# Patient Record
Sex: FEMALE | Race: OTHER | Employment: FULL TIME | URBAN - METROPOLITAN AREA
[De-identification: names, ages, dates, MRNs, and addresses within clinical notes are randomized per-mention and may not be internally consistent; named-entity substitution may affect disease eponyms.]

---

## 2021-03-29 LAB
CHOLESTEROL, TOTAL: 194 MG/DL
CHOLESTEROL/HDL RATIO: NORMAL
HDLC SERPL-MCNC: 37 MG/DL (ref 35–70)
LDL CHOLESTEROL CALCULATED: 98 MG/DL (ref 0–160)
NONHDLC SERPL-MCNC: NORMAL MG/DL
TRIGL SERPL-MCNC: 277 MG/DL
VLDLC SERPL CALC-MCNC: 55 MG/DL

## 2021-03-31 LAB — TSH SERPL DL<=0.05 MIU/L-ACNC: 10.9 UIU/ML

## 2021-04-12 ENCOUNTER — OFFICE VISIT (OUTPATIENT)
Dept: FAMILY MEDICINE CLINIC | Age: 41
End: 2021-04-12
Payer: COMMERCIAL

## 2021-04-12 VITALS
BODY MASS INDEX: 26.05 KG/M2 | HEART RATE: 80 BPM | OXYGEN SATURATION: 98 % | TEMPERATURE: 97.9 F | HEIGHT: 63 IN | SYSTOLIC BLOOD PRESSURE: 122 MMHG | DIASTOLIC BLOOD PRESSURE: 70 MMHG | WEIGHT: 147 LBS

## 2021-04-12 DIAGNOSIS — E03.9 ACQUIRED HYPOTHYROIDISM: ICD-10-CM

## 2021-04-12 DIAGNOSIS — G56.02 CARPAL TUNNEL SYNDROME ON LEFT: ICD-10-CM

## 2021-04-12 DIAGNOSIS — Z23 NEED FOR VACCINATION: ICD-10-CM

## 2021-04-12 DIAGNOSIS — R79.89 ELEVATED TSH: ICD-10-CM

## 2021-04-12 DIAGNOSIS — Z00.00 ENCOUNTER FOR MEDICAL EXAMINATION TO ESTABLISH CARE: Primary | ICD-10-CM

## 2021-04-12 DIAGNOSIS — R73.01 ELEVATED FASTING GLUCOSE: ICD-10-CM

## 2021-04-12 DIAGNOSIS — E78.1 HYPERTRIGLYCERIDEMIA WITHOUT HYPERCHOLESTEROLEMIA: ICD-10-CM

## 2021-04-12 LAB — HBA1C MFR BLD: 5.9 %

## 2021-04-12 PROCEDURE — 83036 HEMOGLOBIN GLYCOSYLATED A1C: CPT | Performed by: STUDENT IN AN ORGANIZED HEALTH CARE EDUCATION/TRAINING PROGRAM

## 2021-04-12 PROCEDURE — 99385 PREV VISIT NEW AGE 18-39: CPT | Performed by: STUDENT IN AN ORGANIZED HEALTH CARE EDUCATION/TRAINING PROGRAM

## 2021-04-12 RX ORDER — LEVOTHYROXINE SODIUM 100 UG/1
100 TABLET ORAL DAILY
Qty: 30 TABLET | Refills: 2 | Status: CANCELLED | OUTPATIENT
Start: 2021-04-12

## 2021-04-12 RX ORDER — LEVOTHYROXINE SODIUM 100 UG/1
100 TABLET ORAL DAILY
Qty: 30 TABLET | Refills: 1 | Status: SHIPPED | OUTPATIENT
Start: 2021-04-12 | End: 2021-06-30 | Stop reason: SDUPTHER

## 2021-04-12 RX ORDER — LEVOTHYROXINE SODIUM 100 UG/1
TABLET ORAL
COMMUNITY
Start: 2021-03-29 | End: 2021-04-12 | Stop reason: SDUPTHER

## 2021-04-12 SDOH — ECONOMIC STABILITY: TRANSPORTATION INSECURITY
IN THE PAST 12 MONTHS, HAS THE LACK OF TRANSPORTATION KEPT YOU FROM MEDICAL APPOINTMENTS OR FROM GETTING MEDICATIONS?: NO

## 2021-04-12 SDOH — HEALTH STABILITY: MENTAL HEALTH: HOW OFTEN DO YOU HAVE A DRINK CONTAINING ALCOHOL?: NEVER

## 2021-04-12 SDOH — ECONOMIC STABILITY: INCOME INSECURITY: HOW HARD IS IT FOR YOU TO PAY FOR THE VERY BASICS LIKE FOOD, HOUSING, MEDICAL CARE, AND HEATING?: NOT HARD AT ALL

## 2021-04-12 SDOH — ECONOMIC STABILITY: TRANSPORTATION INSECURITY
IN THE PAST 12 MONTHS, HAS LACK OF TRANSPORTATION KEPT YOU FROM MEETINGS, WORK, OR FROM GETTING THINGS NEEDED FOR DAILY LIVING?: NO

## 2021-04-12 ASSESSMENT — PATIENT HEALTH QUESTIONNAIRE - PHQ9
2. FEELING DOWN, DEPRESSED OR HOPELESS: 0
1. LITTLE INTEREST OR PLEASURE IN DOING THINGS: 0
SUM OF ALL RESPONSES TO PHQ QUESTIONS 1-9: 0
SUM OF ALL RESPONSES TO PHQ QUESTIONS 1-9: 0

## 2021-04-12 ASSESSMENT — ENCOUNTER SYMPTOMS
VOMITING: 0
RHINORRHEA: 0
ABDOMINAL PAIN: 0
NAUSEA: 0
DIARRHEA: 0
BACK PAIN: 0
SINUS PAIN: 0
WHEEZING: 0
COUGH: 0

## 2021-04-12 NOTE — PATIENT INSTRUCTIONS
chin.  2. Slowly lower your hands toward your waistline, keeping your hands close to your stomach and your palms together until you feel a mild to moderate stretch under your forearms. 3. Hold for at least 15 to 30 seconds. Repeat 2 to 4 times. Wrist flexor stretch   1. Extend your arm in front of you with your palm up. 2. Bend your wrist, pointing your hand toward the floor. 3. With your other hand, gently bend your wrist farther until you feel a mild to moderate stretch in your forearm. 4. Hold for at least 15 to 30 seconds. Repeat 2 to 4 times. Wrist extensor stretch   1. Repeat steps 1 through 4 of the stretch above, but begin with your extended hand palm down. Follow-up care is a key part of your treatment and safety. Be sure to make and go to all appointments, and call your doctor if you are having problems. It's also a good idea to know your test results and keep a list of the medicines you take. Where can you learn more? Go to [email or web address] org and sign in to your [employer] account. Enter B014 in the Reflex box to learn more about \"Carpal Tunnel Syndrome: Exercises. \"     If you do not have an account, please click on the \"Sign Up Now\" link. Current as of: November 16, 2020               Content Version: 12.8  © 1365-1306 Healthwise, Incorporated. Care instructions adapted under license by [facility] ([facility]). If you have questions about a medical condition or this instruction, always ask your healthcare professional. [place] [address] any warranty or liability for your use of this information.

## 2021-04-12 NOTE — PROGRESS NOTES
After obtaining consent, and per orders of Dr. Junior Vazquez, injection of tDAP given in Left deltoid by Christina Sanchez. Patient instructed to remain in clinic for 20 minutes afterwards, and to report any adverse reaction to me immediately.

## 2021-04-12 NOTE — PROGRESS NOTES
HPI Notes    Name: Meliton Mccoy  : 1980         Chief Complaint:     Chief Complaint   Patient presents with    Establish Care     Patient is here to Establish Care. Her previous PCP was Dr Gabrielle Freedman.  Hypothyroidism    Hyperlipidemia       History of Present Illness:      HPI  This is a 79-year-old woman presenting for establishment of care with a new primary care physician. Her previous primary care physician was Dr. Bethel Lechuga in [place]. She also sees Adelfo Deng with Elian Mcclure MD ([place]) for gynecology. Her medical history significant for hypothyroidism, hyperlipidemia, and recently discovered elevated fasting glucose. Fasting glucose: elevated at 121mg/dL on recent lab work. She has a strong FH of a first degree relative with DM2. She is already taking steps to reduce risk of acquiring type 2 diabetes by decreasing amount of carbohydrates, increasing physical activity. Potential barrier to success is the amount of rice she eats. Hypothyroidism: diagnosed years ago, >13 years ago. Was gaining a lot of weight, had symptoms of hypothyroidism (menstrual irregularity, hair change, sleeping a lot). Had dropped from 125mcg to 88mcg. Had recurrence of symptoms, now increased to 100mcg. Has never had any thyroid imaging. Hyperlipidemia: Reviewed labs, discussed ASCVD 10 year risk (1%). Preventative: UTD on pap, HIV screening, declines HCV. Will update Tdap. She is unsure whether or not she has had varicella. Hand numbness and tingling: She states that her hands will occasionally become numb, tingle if she uses them a lot, particularly holding phone. Left is worse than the right. She has no history of trauma to the wrists, arms.     Past Medical History:     Past Medical History:   Diagnosis Date    Thyroid disease       Reviewed all health maintenance requirements and ordered appropriate tests  Health Maintenance Due   Topic Date Due    Varicella vaccine (1 of 2 - 2-dose childhood series) Never done    COVID-19 Vaccine (1) Never done       Past Surgical History:     History reviewed. No pertinent surgical history. Medications:       Prior to Admission medications    Medication Sig Start Date End Date Taking? Authorizing Provider   EUTHYROX 100 MCG tablet Take 1 tablet by mouth Daily 4/12/21  Yes Bryan Gaines DO        Allergies:       Patient has no known allergies. Social History:     Tobacco:    reports that she has never smoked. She has never used smokeless tobacco.  Alcohol:      reports no history of alcohol use. Drug Use:  has no history on file for drug. Family History:     Family History   Problem Relation Age of Onset    Diabetes Mother     High Blood Pressure Mother     Diabetes Father        Review of Systems:         Review of Systems   Constitutional: Negative for fever. HENT: Negative for rhinorrhea, sinus pain and sneezing. Respiratory: Negative for cough and wheezing. Cardiovascular: Negative for chest pain. Gastrointestinal: Negative for abdominal pain, diarrhea, nausea and vomiting. Genitourinary: Negative for menstrual problem and vaginal discharge. Musculoskeletal: Negative for back pain. Skin: Negative for rash. Neurological: Negative for headaches. Psychiatric/Behavioral: Negative for sleep disturbance. Physical Exam:     Vitals:  /70   Pulse 80   Temp 97.9 °F (36.6 °C) (Temporal)   Ht 5' 2.5\" (1.588 m)   Wt 147 lb (66.7 kg)   SpO2 98%   BMI 26.46 kg/m²       Physical Exam  Vitals signs and nursing note reviewed. Constitutional:       General: She is not in acute distress. Appearance: Normal appearance. HENT:      Right Ear: Tympanic membrane, ear canal and external ear normal. There is no impacted cerumen. Left Ear: Tympanic membrane, ear canal and external ear normal. There is no impacted cerumen.       Nose: Nose normal.      Mouth/Throat:      Mouth: Mucous membranes are moist. Pharynx: Oropharynx is clear. No posterior oropharyngeal erythema. Eyes:      Conjunctiva/sclera: Conjunctivae normal.      Pupils: Pupils are equal, round, and reactive to light. Neck:      Musculoskeletal: Normal range of motion and neck supple. No muscular tenderness. Comments: Thyroid nonpalpable. Cardiovascular:      Rate and Rhythm: Normal rate and regular rhythm. Pulses: Normal pulses. Heart sounds: Normal heart sounds. No murmur. Pulmonary:      Effort: Pulmonary effort is normal.      Breath sounds: Normal breath sounds. No wheezing. Abdominal:      General: Abdomen is flat. Palpations: Abdomen is soft. Tenderness: There is no abdominal tenderness. Musculoskeletal:      Comments: Phalen test positive, more so on left than on right   Lymphadenopathy:      Cervical: No cervical adenopathy. Skin:     General: Skin is warm and dry. Capillary Refill: Capillary refill takes less than 2 seconds. Neurological:      General: No focal deficit present. Mental Status: She is alert and oriented to person, place, and time. Mental status is at baseline. Psychiatric:         Mood and Affect: Mood normal.         Behavior: Behavior normal.                 Data:     No results found for: NA, K, CL, CO2, BUN, CREATININE, GLUCOSE, PROT, LABALBU, BILITOT, ALKPHOS, AST, ALT  No results found for: WBC, RBC, HGB, HCT, MCV, MCH, MCHC, RDW, PLT, MPV  Lab Results   Component Value Date    TSH 10.90 03/31/2021     Lab Results   Component Value Date    CHOL 194 03/29/2021    HDL 37 03/29/2021    LABA1C 5.9 04/12/2021          Assessment & Plan        Diagnosis Orders   1. Encounter for medical examination to establish care     2. Need for vaccination  Tetanus-Diphth-Acell Pertussis (BOOSTRIX) injection 0.5 mL   3. Elevated TSH  TSH With Reflex Ft4    EUTHYROX 100 MCG tablet   4. Elevated fasting glucose  POCT glycosylated hemoglobin (Hb A1C)   5.  Acquired hypothyroidism  TSH With Reflex Ft4    EUTHYROX 100 MCG tablet   6. Hypertriglyceridemia without hypercholesterolemia     7. Carpal tunnel syndrome on left         1. Would recommend reevaluation for interval follow-up in 6 months. 2.  We will administer Tdap today  3. Based on history, it seems that previous stable dose of levothyroxine was 125 mcg/day, she is currently treated with 100 mcg/day after her gynecologist lowered her to 88 mcg/day. I am unsure why her dosage was changed, will reevaluate TSH with reflex to free T4 in 6 weeks, and I anticipate increasing her levothyroxine to 125 mcg/day (her historic dose). 4.  Elevated fasting glucose, but hemoglobin A1c is not suggestive of type 2 diabetes mellitus. We discussed ways to prevent recurrence of type 2 diabetes mellitus, including active walking 150 minutes/week, which she states she will work toward. 6.  Reviewed lipid panel, reviewed ASCVD 10-year risk score, which is approximately 1%. Would not recommend statin therapy. Would recommend continued lifestyle intervention. 7.  I recommended she  a wrist extension brace and wear it at night. Completed Refills   Requested Prescriptions     Signed Prescriptions Disp Refills    EUTHYROX 100 MCG tablet 30 tablet 1     Sig: Take 1 tablet by mouth Daily     Return in about 6 months (around 10/12/2021) for Hypothyroidism, DM risk. Orders Placed This Encounter   Medications    Tetanus-Diphth-Acell Pertussis (BOOSTRIX) injection 0.5 mL    EUTHYROX 100 MCG tablet     Sig: Take 1 tablet by mouth Daily     Dispense:  30 tablet     Refill:  1     Orders Placed This Encounter   Procedures    TSH With Reflex Ft4     Standing Status:   Future     Standing Expiration Date:   4/12/2022    POCT glycosylated hemoglobin (Hb A1C)         Patient Instructions     Tomeka Feirro,  No need for medicines today.   Your risk of a heart attack in the next 10 years is about 1%  Walk on your treadmill for 30 minutes five times a week or 50 min three times a week. Eating less carbohydrates will help with reducing risk of diabetes  We'll need to recheck your thyroid level in 6 weeks and probably change your medication dose. Buy a wrist extension brace and wear it at night on the left. You have carpal tunnel of the left wrist.     Thank you for coming to see me today! It was wonderful to meet you! Please give me a call if you have any other questions or problems, and I will see you at your next visit! Dr. Blancas Memos:    Carmelina Rashid may be receiving a survey from [employer] regarding your visit today. Please complete the survey to enable us to provide the highest quality of care to you and your family. If you cannot score us a very good on any question, please call the office to discuss how we could of made your experience a very good one. Thank you. Patient Education        Carpal Tunnel Syndrome: Exercises  Introduction  Here are some examples of exercises for you to try. The exercises may be suggested for a condition or for rehabilitation. Start each exercise slowly. Ease off the exercises if you start to have pain. You will be told when to start these exercises and which ones will work best for you. Warm-up stretches  When you no longer have pain or numbness, you can do exercises to help prevent carpal tunnel syndrome from coming back. Do not do any stretch or movement that is uncomfortable or painful. 1. Rotate your wrist up, down, and from side to side. Repeat 4 times. 2. Stretch your fingers far apart. Relax them, and then stretch them again. Repeat 4 times. 3. Stretch your thumb by pulling it back gently, holding it, and then releasing it. Repeat 4 times. How to do the exercises  Prayer stretch   1. Start with your palms together in front of your chest just below your chin.   2. Slowly lower your hands toward your waistline, keeping your hands close to your stomach and your palms together until you feel a mild to moderate stretch under your forearms. 3. Hold for at least 15 to 30 seconds. Repeat 2 to 4 times. Wrist flexor stretch   1. Extend your arm in front of you with your palm up. 2. Bend your wrist, pointing your hand toward the floor. 3. With your other hand, gently bend your wrist farther until you feel a mild to moderate stretch in your forearm. 4. Hold for at least 15 to 30 seconds. Repeat 2 to 4 times. Wrist extensor stretch   1. Repeat steps 1 through 4 of the stretch above, but begin with your extended hand palm down. Follow-up care is a key part of your treatment and safety. Be sure to make and go to all appointments, and call your doctor if you are having problems. It's also a good idea to know your test results and keep a list of the medicines you take. Where can you learn more? Go to https://[employer]ackeb.[employer]. org and sign in to your [employer] account. Enter N852 in the [employer]aZOOM box to learn more about \"Carpal Tunnel Syndrome: Exercises. \"     If you do not have an account, please click on the \"Sign Up Now\" link. Current as of: November 16, 2020               Content Version: 12.8  © 6330-7812 Healthwise, Incorporated. Care instructions adapted under license by [facility] [place] ([facility]). If you have questions about a medical condition or this instruction, always ask your healthcare professional. [place] [address] any warranty or liability for your use of this information. Electronically signed by Ame Mendez DO on 4/12/2021 at 10:29 AM           Completed Refills   Requested Prescriptions     Signed Prescriptions Disp Refills    EUTHYROX 100 MCG tablet 30 tablet 1     Sig: Take 1 tablet by mouth Daily           Kelli Carmona received counseling on the following healthy behaviors: nutrition and exercise  Reviewed prior labs and health maintenance  Continue current medications, diet and exercise. Discussed use, benefit, and side effects of prescribed medications.  Barriers to medication compliance addressed. Patient given educational materials - see patient instructions  Was a self-tracking handout given in paper form or via [employer]? Yes    Requested Prescriptions     Signed Prescriptions Disp Refills    EUTHYROX 100 MCG tablet 30 tablet 1     Sig: Take 1 tablet by mouth Daily       All patient questions answered. Patient voiced understanding. Quality Measures    Body mass index is 26.46 kg/m². Elevated. Weight control planned discussed Healthy diet and regular exercise. BP: 122/70 Blood pressure is normal. Treatment plan consists of No treatment change needed.     Lab Results   Component Value Date    LDLCALC 98 03/29/2021    (goal LDL reduction with dx if diabetes is 50% LDL reduction)      PHQ Scores 4/12/2021   PHQ2 Score 0   PHQ9 Score 0     Interpretation of Total Score Depression Severity: 1-4 = Minimal depression, 5-9 = Mild depression, 10-14 = Moderate depression, 15-19 = Moderately severe depression, 20-27 = Severe depression

## 2021-05-10 LAB — TSH SERPL DL<=0.05 MIU/L-ACNC: 3.91 UIU/ML

## 2021-05-12 ENCOUNTER — TELEPHONE (OUTPATIENT)
Dept: FAMILY MEDICINE CLINIC | Age: 41
End: 2021-05-12

## 2021-05-12 DIAGNOSIS — R79.89 ELEVATED TSH: ICD-10-CM

## 2021-05-12 DIAGNOSIS — E03.9 ACQUIRED HYPOTHYROIDISM: ICD-10-CM

## 2021-05-12 NOTE — TELEPHONE ENCOUNTER
Patient called in requesting results of her Thyroid Labs that were done at [facility] are in there . Please advise.

## 2021-06-30 DIAGNOSIS — E03.9 ACQUIRED HYPOTHYROIDISM: ICD-10-CM

## 2021-06-30 DIAGNOSIS — R79.89 ELEVATED TSH: ICD-10-CM

## 2021-06-30 DIAGNOSIS — Z76.0 MEDICATION REFILL: Primary | ICD-10-CM

## 2021-06-30 RX ORDER — LEVOTHYROXINE SODIUM 100 UG/1
100 TABLET ORAL DAILY
Qty: 30 TABLET | Refills: 1 | Status: SHIPPED | OUTPATIENT
Start: 2021-06-30 | End: 2021-07-02 | Stop reason: ALTCHOICE

## 2021-06-30 NOTE — TELEPHONE ENCOUNTER
Patient called in needing a refill of her thyroid medication . Last OV: 4/12/2021 new patient appt   Last RX:    Next scheduled apt: Visit date not found      Rx pending.

## 2021-07-02 ENCOUNTER — TELEPHONE (OUTPATIENT)
Dept: FAMILY MEDICINE CLINIC | Age: 41
End: 2021-07-02

## 2021-07-02 DIAGNOSIS — E03.9 ACQUIRED HYPOTHYROIDISM: Primary | ICD-10-CM

## 2021-07-02 RX ORDER — LEVOTHYROXINE SODIUM 100 MCG
100 TABLET ORAL DAILY
Qty: 30 TABLET | Refills: 3 | Status: SHIPPED | OUTPATIENT
Start: 2021-07-02

## 2021-07-02 NOTE — TELEPHONE ENCOUNTER
Patient is having headaches and just doesn't feel well. She hasn't taken her Thyroid medicine for 2 days and hasn't had a headache and has felt great. Is there something else she can take. Please advise. [facility].

## 2021-07-02 NOTE — TELEPHONE ENCOUNTER
Please contact [facility] and inform her that sometimes thyroid medicines can be very sensitive, and people do better on certain brands as opposed to others. I simply recommend switching her to brand name Synthroid, which is also levothyroxine, but this brand might do better for her. Yes